# Patient Record
Sex: FEMALE | Race: WHITE | NOT HISPANIC OR LATINO | Employment: OTHER | URBAN - METROPOLITAN AREA
[De-identification: names, ages, dates, MRNs, and addresses within clinical notes are randomized per-mention and may not be internally consistent; named-entity substitution may affect disease eponyms.]

---

## 2024-02-21 NOTE — PROGRESS NOTES
Farzana Michelle female   1955 68 y.o.   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 Also, at same time a left breast core biopsy was performed demonstrating atypical ductal hyperplasia (ADH) and radial scar. She presented to  for second opinion. 6/12/2019 Mary Lopez performed a right breast Magseed localized partial mastectomy, right Magseed localized excisional biopsy, Magtrace injection for SentiNOT, left breast Magseed localized excisional biopsy and a bilateral oncoplastic reduction. Final pathology demonstrated right breast DCIS, intermediate grade, solid pattern, in part involving sclerosing adenosis and a complex sclerosing lesion, greatest dimension 2.1 cm with positive margin at new superior margin and less than 1mm from new medial margins and 2mm from final anterior margin. Right breast excisional biopsy demonstrated complex sclerosing lesion. Left breast excisional biopsy demonstrated DCIS, intermediate to high nuclear grade in part involving sclerosing adenosis, estimated size 2.1cm, margins positive. Left breast second excisional biopsy site demonstrated DCIS, intermediate to high grade involving sclerosing adenosis, estimated size 2.0cm, positive margin at anterior and less than 1mm from superior and lateral margins.     7/3/2019 she returned to the OR for right breast nipple sparing completion mastectomy with sentinel lymph node biopsy and left breast nipple sparing mastectomy with bilateral direct implant reconstruction (MODESTO Snow). Final pathology demonstrated right breast tissue with no residual carcinoma, two sentinel lymph nodes, negative (0/2) and left breast tissue with no residual carcinoma. 8/4/2021 she returned to the OR for revision  of bilateral reconstruction with silicone implant exchange and fat grafting (MODESTO Snow).      She presents today for annual exam. She is here with her , Anthony. She denies any new masses or lumps. She states she is still considering additional surgery with Dr. Snow, but hasn't met with him yet. She lives in [place], [place].      FEMALE HISTORY: menarche age 11, , first birth age 33, menopause age 53 (s/p partial hysterectomy for abnormal bleeding), ovaries intact, took Raloxifene [id number] for osteoporosis, no HRT     FAMILY CANCER HISTORY:  Father: Bone cancer  Sister: Gallbladder cancer      Surgical History  She has a past surgical history that includes Other surgical history (2019); Other surgical history (2019); Other surgical history (2019); Other surgical history (10/27/2020); Other surgical history (2019); BI mammo guided breast right localization (Right, 2019); and BI mammo guided breast left localization (Left, 2019).     Social History  She reports that she quit smoking about 36 years ago. Her smoking use included cigarettes. She started smoking about 52 years ago. She has never used smokeless tobacco. She reports that she does not currently use alcohol. She reports that she does not use drugs.    Family History  No family history on file.     Allergies  Bee venom protein (honey bee)    Medications  Current Outpatient Medications   Medication Instructions    acetaminophen (Tylenol) 325 mg tablet oral, Every 4 hours RT    benzonatate (Tessalon) 200 mg capsule     Bifidobacterium infantis (ALIGN) 4 mg capsule oral    bimatoprost (Latisse) 0.03 % ophthalmic solution 1 drop, Topical, Daily RT    biotin 10,000 mcg tablet,disintegrating oral    busPIRone (BUSPAR) 10 mg, oral    cholecalciferol (Vitamin D3) 50 mcg (2,000 unit) capsule oral    cyclobenzaprine (FLEXERIL) 10 mg, oral, 3 times daily PRN    fluticasone (Flonase) 50 mcg/actuation nasal spray 2 sprays,  nasal, Daily RT    hydroCHLOROthiazide (HYDRODiuril) 25 mg tablet 1 tablet, oral, Daily    HYDROcodone-acetaminophen (Norco) 5-325 mg tablet     L. acidophilus/Bifid. animalis 32 billion cell capsule oral    levothyroxine (SYNTHROID) 100 mcg, oral, Daily RT    linaCLOtide (LINZESS) 72 mcg, oral, Daily RT    meloxicam (Mobic) 15 mg tablet     multivitamin tablet oral    omeprazole (PRILOSEC) 40 mg, oral, 2 times daily    ondansetron ODT (ZOFRAN-ODT) 4 mg, oral, Every 8 hours PRN    tiZANidine (Zanaflex) 2 mg capsule oral    triamterene-hydrochlorothiazid (Dyazide) 37.5-25 mg capsule 1 capsule, oral, Daily RT         REVIEW OF SYSTEMS    Constitutional:  Negative for appetite change, fatigue, fever and unexpected weight change.   HENT:  Negative for ear pain, hearing loss, nosebleeds, sore throat and trouble swallowing.    Eyes:  Negative for discharge, itching and visual disturbance.   Respiratory:  Negative for cough, chest tightness and shortness of breath.    Cardiovascular:  Negative for chest pain, palpitations and leg swelling.   Breast: as indicated in HPI  Gastrointestinal:  Negative for abdominal pain, constipation, diarrhea and nausea.   Endocrine: Negative for cold intolerance and heat intolerance.   Genitourinary:  Negative for dysuria, frequency, hematuria, pelvic pain and vaginal bleeding.   Musculoskeletal:  Negative for arthralgias, back pain, gait problem, joint swelling and myalgias.   Skin:  Negative for color change and rash.   Allergic/Immunologic: Negative for environmental allergies and food allergies.   Neurological:  Negative for dizziness, tremors, speech difficulty, weakness, numbness and headaches.   Hematological:  Does not bruise/bleed easily.   Psychiatric/Behavioral:  Negative for agitation, dysphoric mood and sleep disturbance. The patient is not nervous/anxious.         Past Medical History  She has a past medical history of Personal history of other mental and behavioral  disorders.     Physical Exam  Patient is alert and oriented x3 and in a relaxed and appropriate mood. Her gait is steady and hand grasps are equal. Sclera is clear. Both breasts have been removed with well-healed pedicle method incisions and silicone implants in place, smooth and mobile. The overlying tissue is soft without palpable abnormalities, discrete nodules or masses. The skin and nipples appear normal. The right axilla has a well-healed biopsy incision with palpable scar tissue. There is no cervical, supraclavicular or axillary lymphadenopathy. Heart rate and rhythm normal, S1 and S2 appreciated. The lungs are clear to auscultation bilaterally. Abdomen is soft and non-tender.       Physical Exam  Chest:              Last Recorded Vitals  Vitals:    03/04/24 1323   BP: 145/84   Pulse: 80            Assessment/Plan   Normal clinical exam and imaging, history of bilateral DCIS, s/p bilateral mastectomy with reconstruction, no family history of breast cancer     Plan: Return in one year for clinical exam and office visit.     Patient Discussion/Summary  Your clinical examination and imaging are normal. Please return in one year for clinical exam and office visit or sooner if you have any problems or concerns.     You can see your health information, review clinical summaries from office visits & test results online when you follow your health with MY  Chart, a personal health record. To sign up go to [email or web address]. If you need assistance with signing up or trouble getting into your account call [employer] Patient Line 24/7 at [phone].    My office phone number is [phone] if you need to get in touch with me or have additional questions or concerns. Thank you for choosing [facility] and trusting me as your healthcare provider. I look forward to seeing you again at your next office visit. I am honored to be a provider on your health care team and I remain dedicated to helping you  achieve your health goals.      Stella Arce, APRN-CNP

## 2024-02-28 PROBLEM — J04.0 REFLUX LARYNGITIS: Status: ACTIVE | Noted: 2022-06-17

## 2024-02-28 PROBLEM — G47.00 INSOMNIA: Status: ACTIVE | Noted: 2024-02-28

## 2024-02-28 PROBLEM — Z98.890 S/P BREAST RECONSTRUCTION: Status: ACTIVE | Noted: 2024-02-28

## 2024-02-28 PROBLEM — E55.9 VITAMIN D DEFICIENCY: Status: ACTIVE | Noted: 2020-12-02

## 2024-02-28 PROBLEM — F41.8 DEPRESSION WITH ANXIETY: Status: ACTIVE | Noted: 2024-02-28

## 2024-02-28 PROBLEM — N65.0 ACQUIRED CONTOUR DEFORMITY OF BREAST: Status: ACTIVE | Noted: 2024-02-28

## 2024-02-28 PROBLEM — R92.8 ABNORMAL MRI, BREAST: Status: ACTIVE | Noted: 2024-02-28

## 2024-02-28 PROBLEM — F90.0 ATTENTION DEFICIT HYPERACTIVITY DISORDER (ADHD), PREDOMINANTLY INATTENTIVE TYPE: Status: ACTIVE | Noted: 2019-06-19

## 2024-02-28 PROBLEM — L72.9 CUTANEOUS CYST: Status: ACTIVE | Noted: 2021-12-07

## 2024-02-28 PROBLEM — N62 MACROMASTIA: Status: ACTIVE | Noted: 2024-02-28

## 2024-02-28 PROBLEM — K58.1 IRRITABLE BOWEL SYNDROME WITH CONSTIPATION: Status: ACTIVE | Noted: 2019-11-06

## 2024-02-28 PROBLEM — I10 BENIGN ESSENTIAL HYPERTENSION: Status: ACTIVE | Noted: 2019-06-19

## 2024-02-28 PROBLEM — C50.919 BREAST CANCER (MULTI): Status: ACTIVE | Noted: 2019-11-06

## 2024-02-28 PROBLEM — E03.9 HYPOTHYROIDISM: Status: ACTIVE | Noted: 2024-02-28

## 2024-02-28 PROBLEM — Z86.74 HISTORY OF CARDIAC ARREST: Status: ACTIVE | Noted: 2019-06-19

## 2024-02-28 PROBLEM — N65.1 BREAST ASYMMETRY FOLLOWING RECONSTRUCTIVE SURGERY: Status: ACTIVE | Noted: 2024-02-28

## 2024-02-28 PROBLEM — Z90.13 S/P MASTECTOMY, BILATERAL: Status: ACTIVE | Noted: 2024-02-28

## 2024-02-28 PROBLEM — E78.5 HYPERLIPIDEMIA: Status: ACTIVE | Noted: 2020-12-03

## 2024-02-28 PROBLEM — M81.0 OSTEOPOROSIS: Status: ACTIVE | Noted: 2024-02-28

## 2024-02-28 PROBLEM — K21.9 REFLUX LARYNGITIS: Status: ACTIVE | Noted: 2022-06-17

## 2024-02-28 RX ORDER — TRIAMTERENE AND HYDROCHLOROTHIAZIDE 37.5; 25 MG/1; MG/1
1 CAPSULE ORAL
COMMUNITY
Start: 2020-08-25

## 2024-02-28 RX ORDER — LEVOTHYROXINE SODIUM 100 UG/1
100 TABLET ORAL
COMMUNITY
Start: 2022-06-17

## 2024-02-28 RX ORDER — ONDANSETRON 4 MG/1
4 TABLET, ORALLY DISINTEGRATING ORAL EVERY 8 HOURS PRN
COMMUNITY
Start: 2024-01-21

## 2024-02-28 RX ORDER — CHOLECALCIFEROL (VITAMIN D3) 125 MCG
CAPSULE ORAL
COMMUNITY

## 2024-02-28 RX ORDER — FLUTICASONE PROPIONATE 50 MCG
2 SPRAY, SUSPENSION (ML) NASAL
COMMUNITY
Start: 2020-04-08

## 2024-02-28 RX ORDER — BIMATOPROST 3 UG/ML
1 SOLUTION TOPICAL
COMMUNITY
Start: 2022-07-27

## 2024-02-28 RX ORDER — MULTIVITAMIN
TABLET ORAL
COMMUNITY

## 2024-02-28 RX ORDER — ACETAMINOPHEN 325 MG/1
TABLET ORAL
COMMUNITY
Start: 2021-08-07

## 2024-02-28 RX ORDER — CYCLOBENZAPRINE HCL 10 MG
10 TABLET ORAL 3 TIMES DAILY PRN
COMMUNITY
Start: 2021-03-17

## 2024-02-28 RX ORDER — BUSPIRONE HYDROCHLORIDE 10 MG/1
10 TABLET ORAL
COMMUNITY
Start: 2023-04-03

## 2024-02-28 RX ORDER — HYDROCHLOROTHIAZIDE 25 MG/1
1 TABLET ORAL DAILY
COMMUNITY

## 2024-02-28 RX ORDER — ACETAMINOPHEN 500 MG
TABLET ORAL
COMMUNITY

## 2024-02-28 RX ORDER — OMEPRAZOLE 40 MG/1
40 CAPSULE, DELAYED RELEASE ORAL 2 TIMES DAILY
COMMUNITY

## 2024-03-04 ENCOUNTER — OFFICE VISIT (OUTPATIENT)
Dept: SURGICAL ONCOLOGY | Facility: CLINIC | Age: 69
End: 2024-03-04
Payer: MEDICARE

## 2024-03-04 VITALS
SYSTOLIC BLOOD PRESSURE: 145 MMHG | HEIGHT: 62 IN | HEART RATE: 80 BPM | DIASTOLIC BLOOD PRESSURE: 84 MMHG | WEIGHT: 139.99 LBS | BODY MASS INDEX: 25.76 KG/M2

## 2024-03-04 DIAGNOSIS — Z08 ENCOUNTER FOR FOLLOW-UP SURVEILLANCE OF BREAST CANCER: Primary | ICD-10-CM

## 2024-03-04 DIAGNOSIS — Z85.3 ENCOUNTER FOR FOLLOW-UP SURVEILLANCE OF BREAST CANCER: Primary | ICD-10-CM

## 2024-03-04 PROCEDURE — 3079F DIAST BP 80-89 MM HG: CPT | Performed by: NURSE PRACTITIONER

## 2024-03-04 PROCEDURE — 3077F SYST BP >= 140 MM HG: CPT | Performed by: NURSE PRACTITIONER

## 2024-03-04 PROCEDURE — 1126F AMNT PAIN NOTED NONE PRSNT: CPT | Performed by: NURSE PRACTITIONER

## 2024-03-04 PROCEDURE — 99213 OFFICE O/P EST LOW 20 MIN: CPT | Performed by: NURSE PRACTITIONER

## 2024-03-04 PROCEDURE — 1159F MED LIST DOCD IN RCRD: CPT | Performed by: NURSE PRACTITIONER

## 2024-03-04 RX ORDER — MELOXICAM 15 MG/1
TABLET ORAL
COMMUNITY
Start: 2024-02-15

## 2024-03-04 RX ORDER — TIZANIDINE HYDROCHLORIDE 2 MG/1
CAPSULE, GELATIN COATED ORAL
COMMUNITY
Start: 2021-03-23

## 2024-03-04 RX ORDER — HYDROCODONE BITARTRATE AND ACETAMINOPHEN 5; 325 MG/1; MG/1
TABLET ORAL
COMMUNITY
Start: 2024-02-06

## 2024-03-04 RX ORDER — BENZONATATE 200 MG/1
CAPSULE ORAL
COMMUNITY
Start: 2024-01-21

## 2024-03-04 ASSESSMENT — PAIN SCALES - GENERAL: PAINLEVEL: 0-NO PAIN

## 2024-03-04 NOTE — PATIENT INSTRUCTIONS
Your clinical examination and imaging are normal. Please return in one year for clinical exam and office visit or sooner if you have any problems or concerns.     You can see your health information, review clinical summaries from office visits & test results online when you follow your health with MY  Chart, a personal health record. To sign up go to [email or web address]. If you need assistance with signing up or trouble getting into your account call [employer] Patient Line 24/7 at [phone].    My office phone number is [phone] if you need to get in touch with me or have additional questions or concerns. Thank you for choosing [facility] and trusting me as your healthcare provider. I look forward to seeing you again at your next office visit. I am honored to be a provider on your health care team and I remain dedicated to helping you achieve your health goals.

## 2025-02-12 NOTE — PROGRESS NOTES
Farzana Michelle female   1955 69 y.o.   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 Also, at same time a left breast core biopsy was performed demonstrating atypical ductal hyperplasia (ADH) and radial scar. She presented to  for second opinion. 6/12/2019 Mary Lopez performed a right breast Magseed localized partial mastectomy, right Magseed localized excisional biopsy, Magtrace injection for SentiNOT, left breast Magseed localized excisional biopsy and a bilateral oncoplastic reduction. Final pathology demonstrated right breast DCIS, intermediate grade, solid pattern, in part involving sclerosing adenosis and a complex sclerosing lesion, greatest dimension 2.1 cm with positive margin at new superior margin and less than 1mm from new medial margins and 2mm from final anterior margin. Right breast excisional biopsy demonstrated complex sclerosing lesion. Left breast excisional biopsy demonstrated DCIS, intermediate to high nuclear grade in part involving sclerosing adenosis, estimated size 2.1cm, margins positive. Left breast second excisional biopsy site demonstrated DCIS, intermediate to high grade involving sclerosing adenosis, estimated size 2.0cm, positive margin at anterior and less than 1mm from superior and lateral margins.     7/3/2019 she returned to the OR for right breast nipple sparing completion mastectomy with sentinel lymph node biopsy and left breast nipple sparing mastectomy with bilateral direct implant reconstruction (MODESTO Snow). Final pathology demonstrated right breast tissue with no residual carcinoma, two sentinel lymph nodes, negative (0/2) and left breast tissue with no residual carcinoma. 8/4/2021 she returned to the OR for revision  of bilateral reconstruction with silicone implant exchange and fat grafting (MODESTO Snow).      She presents today for annual exam. She is here with her , Anthony. She denies any new masses or lumps. She states she is still considering additional surgery with Dr. Snow, but hasn't met with him yet. She lives in [place], [place].      FEMALE HISTORY: menarche age 11, , first birth age 33, menopause age 53 (s/p partial hysterectomy for abnormal bleeding), ovaries intact, took Raloxifene [id number] for osteoporosis, no HRT     FAMILY CANCER HISTORY:  Father: Bone cancer  Sister: Gallbladder cancer      Surgical History  She has a past surgical history that includes Other surgical history (2019); Other surgical history (2019); Other surgical history (2019); Other surgical history (10/27/2020); Other surgical history (2019); BI mammo guided breast right localization (Right, 2019); and BI mammo guided breast left localization (Left, 2019).     Social History  She reports that she quit smoking about 37 years ago. Her smoking use included cigarettes. She started smoking about 53 years ago. She has never used smokeless tobacco. She reports that she does not currently use alcohol. She reports that she does not use drugs.    Family History  No family history on file.     Allergies  Bee venom protein (honey bee)    Medications  Current Outpatient Medications   Medication Instructions    acetaminophen (Tylenol) 325 mg tablet oral, Every 4 hours RT    benzonatate (Tessalon) 200 mg capsule     Bifidobacterium infantis (ALIGN) 4 mg capsule oral    bimatoprost (Latisse) 0.03 % ophthalmic solution 1 drop, Topical, Daily RT    biotin 10,000 mcg tablet,disintegrating oral    busPIRone (BUSPAR) 10 mg, oral    cholecalciferol (Vitamin D3) 50 mcg (2,000 unit) capsule oral    cyclobenzaprine (FLEXERIL) 10 mg, oral, 3 times daily PRN    fluticasone (Flonase) 50 mcg/actuation nasal spray 2 sprays,  nasal, Daily RT    hydroCHLOROthiazide (HYDRODiuril) 25 mg tablet 1 tablet, oral, Daily    HYDROcodone-acetaminophen (Norco) 5-325 mg tablet     L. acidophilus/Bifid. animalis 32 billion cell capsule oral    levothyroxine (SYNTHROID) 100 mcg, oral, Daily RT    linaCLOtide (LINZESS) 72 mcg, oral, Daily RT    meloxicam (Mobic) 15 mg tablet     multivitamin tablet oral    omeprazole (PRILOSEC) 40 mg, oral, 2 times daily    ondansetron ODT (ZOFRAN-ODT) 4 mg, oral, Every 8 hours PRN    tiZANidine (Zanaflex) 2 mg capsule oral    triamterene-hydrochlorothiazid (Dyazide) 37.5-25 mg capsule 1 capsule, oral, Daily RT         REVIEW OF SYSTEMS    Constitutional:  Negative for appetite change, fatigue, fever and unexpected weight change.   HENT:  Negative for ear pain, hearing loss, nosebleeds, sore throat and trouble swallowing.    Eyes:  Negative for discharge, itching and visual disturbance.   Respiratory:  Negative for cough, chest tightness and shortness of breath.    Cardiovascular:  Negative for chest pain, palpitations and leg swelling.   Breast: as indicated in HPI  Gastrointestinal:  Negative for abdominal pain, constipation, diarrhea and nausea.   Endocrine: Negative for cold intolerance and heat intolerance.   Genitourinary:  Negative for dysuria, frequency, hematuria, pelvic pain and vaginal bleeding.   Musculoskeletal:  Negative for arthralgias, back pain, gait problem, joint swelling and myalgias.   Skin:  Negative for color change and rash.   Allergic/Immunologic: Negative for environmental allergies and food allergies.   Neurological:  Negative for dizziness, tremors, speech difficulty, weakness, numbness and headaches.   Hematological:  Does not bruise/bleed easily.   Psychiatric/Behavioral:  Negative for agitation, dysphoric mood and sleep disturbance. The patient is not nervous/anxious.         Past Medical History  She has a past medical history of Personal history of other mental and behavioral  disorders.     Physical Exam  Patient is alert and oriented x3 and in a relaxed and appropriate mood. Her gait is steady and hand grasps are equal. Sclera is clear. Both breasts have been removed with well-healed pedicle method incisions and silicone implants in place, smooth and mobile. The overlying tissue is soft without palpable abnormalities, discrete nodules or masses. The skin and nipples appear normal. The right axilla has a well-healed biopsy incision with palpable scar tissue. There is no cervical, supraclavicular or axillary lymphadenopathy. Heart rate and rhythm normal, S1 and S2 appreciated. The lungs are clear to auscultation bilaterally. Abdomen is soft and non-tender.       Physical Exam  Chest:              Last Recorded Vitals  There were no vitals filed for this visit.           Assessment/Plan   Normal clinical exam and imaging, history of bilateral DCIS, s/p bilateral mastectomy with reconstruction, no family history of breast cancer     Plan: Return in one year for clinical exam and office visit.     Patient Discussion/Summary  Your clinical examination is normal. Please return in one year for clinical exam and office visit or sooner if you have any problems or concerns.     You can see your health information, review clinical summaries from office visits & test results online when you follow your health with MY  Chart, a personal health record. To sign up go to [email or web address]. If you need assistance with signing up or trouble getting into your account call [employer] Patient Line 24/7 at [phone].    My office phone number is [phone] if you need to get in touch with me or have additional questions or concerns. Thank you for choosing [facility] and trusting me as your healthcare provider. I look forward to seeing you again at your next office visit. I am honored to be a provider on your health care team and I remain dedicated to helping you achieve your health  goals.      tSella Arce, APRN-CNP

## 2025-03-03 ENCOUNTER — APPOINTMENT (OUTPATIENT)
Dept: SURGICAL ONCOLOGY | Facility: CLINIC | Age: 70
End: 2025-03-03
Payer: MEDICARE

## 2025-03-26 NOTE — PROGRESS NOTES
Farzana Michelle female   1955 69 y.o.   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 Also, at same time a left breast core biopsy was performed demonstrating atypical ductal hyperplasia (ADH) and radial scar. She presented to  for second opinion. 6/12/2019 Mary Lopez performed a right breast Magseed localized partial mastectomy, right Magseed localized excisional biopsy, Magtrace injection for SentiNOT, left breast Magseed localized excisional biopsy and a bilateral oncoplastic reduction. Final pathology demonstrated right breast DCIS, intermediate grade, solid pattern, in part involving sclerosing adenosis and a complex sclerosing lesion, greatest dimension 2.1 cm with positive margin at new superior margin and less than 1mm from new medial margins and 2mm from final anterior margin. Right breast excisional biopsy demonstrated complex sclerosing lesion. Left breast excisional biopsy demonstrated DCIS, intermediate to high nuclear grade in part involving sclerosing adenosis, estimated size 2.1cm, margins positive. Left breast second excisional biopsy site demonstrated DCIS, intermediate to high grade involving sclerosing adenosis, estimated size 2.0cm, positive margin at anterior and less than 1mm from superior and lateral margins.     7/3/2019 she returned to the OR for right breast nipple sparing completion mastectomy with sentinel lymph node biopsy and left breast nipple sparing mastectomy with bilateral direct implant reconstruction (MODESTO Snow). Final pathology demonstrated right breast tissue with no residual carcinoma, two sentinel lymph nodes, negative (0/2) and left breast tissue with no residual carcinoma. 8/4/2021 she returned to the OR for revision  of bilateral reconstruction with silicone implant exchange and fat grafting (MODESTO Snow).      She presents today for annual exam. She is here with her , Anthony. She denies any new masses or lumps. She states she is still considering additional surgery with Dr. Snow, but hasn't met with him yet. She lives in [place], [place].      FEMALE HISTORY: menarche age 11, , first birth age 33, menopause age 53 (s/p partial hysterectomy for abnormal bleeding), ovaries intact, took Raloxifene [id number] for osteoporosis, no HRT     FAMILY CANCER HISTORY:  Father: Bone cancer  Sister: Gallbladder cancer      Surgical History  She has a past surgical history that includes Other surgical history (2019); Other surgical history (2019); Other surgical history (2019); Other surgical history (10/27/2020); Other surgical history (2019); BI mammo guided breast right localization (Right, 2019); and BI mammo guided breast left localization (Left, 2019).     Social History  She reports that she quit smoking about 37 years ago. Her smoking use included cigarettes. She started smoking about 53 years ago. She has never used smokeless tobacco. She reports that she does not currently use alcohol. She reports that she does not use drugs.    Family History  No family history on file.     Allergies  Bee venom protein (honey bee)    Medications  Current Outpatient Medications   Medication Instructions    acetaminophen (Tylenol) 325 mg tablet Every 4 hours RT    benzonatate (Tessalon) 200 mg capsule     Bifidobacterium infantis (ALIGN) 4 mg capsule Take by mouth.    bimatoprost (Latisse) 0.03 % ophthalmic solution 1 drop, Daily RT    biotin 10,000 mcg tablet,disintegrating Take by mouth.    busPIRone (BUSPAR) 10 mg    cholecalciferol (Vitamin D3) 50 mcg (2,000 unit) capsule Take by mouth.    cyclobenzaprine (FLEXERIL) 10 mg, 3 times daily PRN    fluticasone (Flonase) 50 mcg/actuation nasal spray 2 sprays,  Daily RT    hydroCHLOROthiazide (HYDRODiuril) 25 mg tablet 1 tablet, Daily    HYDROcodone-acetaminophen (Norco) 5-325 mg tablet     L. acidophilus/Bifid. animalis 32 billion cell capsule Take by mouth.    levothyroxine (SYNTHROID) 100 mcg, Daily RT    linaCLOtide (LINZESS) 72 mcg, Daily RT    meloxicam (Mobic) 15 mg tablet     multivitamin tablet Take by mouth.    omeprazole (PRILOSEC) 40 mg, 2 times daily    ondansetron ODT (ZOFRAN-ODT) 4 mg, Every 8 hours PRN    tiZANidine (Zanaflex) 2 mg capsule Take by mouth.    triamterene-hydrochlorothiazid (Dyazide) 37.5-25 mg capsule 1 capsule, Daily RT         REVIEW OF SYSTEMS    Constitutional:  Negative for appetite change, fatigue, fever and unexpected weight change.   HENT:  Negative for ear pain, hearing loss, nosebleeds, sore throat and trouble swallowing.    Eyes:  Negative for discharge, itching and visual disturbance.   Respiratory:  Negative for cough, chest tightness and shortness of breath.    Cardiovascular:  Negative for chest pain, palpitations and leg swelling.   Breast: as indicated in HPI  Gastrointestinal:  Negative for abdominal pain, constipation, diarrhea and nausea.   Endocrine: Negative for cold intolerance and heat intolerance.   Genitourinary:  Negative for dysuria, frequency, hematuria, pelvic pain and vaginal bleeding.   Musculoskeletal:  Negative for arthralgias, back pain, gait problem, joint swelling and myalgias.   Skin:  Negative for color change and rash.   Allergic/Immunologic: Negative for environmental allergies and food allergies.   Neurological:  Negative for dizziness, tremors, speech difficulty, weakness, numbness and headaches.   Hematological:  Does not bruise/bleed easily.   Psychiatric/Behavioral:  Negative for agitation, dysphoric mood and sleep disturbance. The patient is not nervous/anxious.         Past Medical History  She has a past medical history of Personal history of other mental and behavioral disorders.     Physical  Exam  Patient is alert and oriented x3 and in a relaxed and appropriate mood. Her gait is steady and hand grasps are equal. Sclera is clear. Both breasts have been removed with well-healed pedicle method incisions and silicone implants in place, smooth and mobile. The overlying tissue is soft without palpable abnormalities, discrete nodules or masses. The skin and nipples appear normal. The right axilla has a well-healed biopsy incision with palpable scar tissue. There is no cervical, supraclavicular or axillary lymphadenopathy. Heart rate and rhythm normal, S1 and S2 appreciated. The lungs are clear to auscultation bilaterally.     Physical Exam  Chest:              Last Recorded Vitals  Vitals:    04/10/25 1503   BP: 137/69   Pulse: 73   Temp: 36.2 °C (97.2 °F)   SpO2: 99%              Assessment/Plan   Normal clinical exam, history of bilateral DCIS, s/p bilateral mastectomy with reconstruction, no family history of breast cancer     Plan: Return in one year for clinical exam and office visit.     Patient Discussion/Summary  Your clinical examination is normal. Please return in one year for clinical exam and office visit or sooner if you have any problems or concerns.     You can see your health information, review clinical summaries from office visits & test results online when you follow your health with MY  Chart, a personal health record. To sign up go to [email or web address]. If you need assistance with signing up or trouble getting into your account call [employer] [facility] Patient Line 24/7 at [phone].    My office phone number is [phone] if you need to get in touch with me or have additional questions or concerns. Thank you for choosing [facility] and trusting me as your healthcare provider. I look forward to seeing you again at your next office visit. I am honored to be a provider on your health care team and I remain dedicated to helping you achieve your health goals.      Stella MOLINA  Claude, APRN-CNP

## 2025-04-10 ENCOUNTER — OFFICE VISIT (OUTPATIENT)
Dept: SURGICAL ONCOLOGY | Facility: CLINIC | Age: 70
End: 2025-04-10
Payer: MEDICARE

## 2025-04-10 VITALS
BODY MASS INDEX: 34.91 KG/M2 | HEART RATE: 73 BPM | SYSTOLIC BLOOD PRESSURE: 137 MMHG | TEMPERATURE: 97.2 F | DIASTOLIC BLOOD PRESSURE: 69 MMHG | WEIGHT: 192.4 LBS | OXYGEN SATURATION: 99 %

## 2025-04-10 DIAGNOSIS — Z85.3 ENCOUNTER FOR FOLLOW-UP SURVEILLANCE OF BREAST CANCER: Primary | ICD-10-CM

## 2025-04-10 DIAGNOSIS — Z08 ENCOUNTER FOR FOLLOW-UP SURVEILLANCE OF BREAST CANCER: Primary | ICD-10-CM

## 2025-04-10 PROCEDURE — 3078F DIAST BP <80 MM HG: CPT | Performed by: NURSE PRACTITIONER

## 2025-04-10 PROCEDURE — 1036F TOBACCO NON-USER: CPT | Performed by: NURSE PRACTITIONER

## 2025-04-10 PROCEDURE — 99213 OFFICE O/P EST LOW 20 MIN: CPT | Performed by: NURSE PRACTITIONER

## 2025-04-10 PROCEDURE — 1159F MED LIST DOCD IN RCRD: CPT | Performed by: NURSE PRACTITIONER

## 2025-04-10 PROCEDURE — 1126F AMNT PAIN NOTED NONE PRSNT: CPT | Performed by: NURSE PRACTITIONER

## 2025-04-10 PROCEDURE — 3075F SYST BP GE 130 - 139MM HG: CPT | Performed by: NURSE PRACTITIONER

## 2025-04-10 ASSESSMENT — PAIN SCALES - GENERAL: PAINLEVEL_OUTOF10: 0-NO PAIN

## 2025-04-10 NOTE — PATIENT INSTRUCTIONS
Your clinical examination is normal. Please return in one year for clinical exam and office visit or sooner if you have any problems or concerns.     You can see your health information, review clinical summaries from office visits & test results online when you follow your health with MY  Chart, a personal health record. To sign up go to [email or web address]. If you need assistance with signing up or trouble getting into your account call [employer] Patient Line 24/7 at [phone].    My office phone number is [phone] if you need to get in touch with me or have additional questions or concerns. Thank you for choosing [facility] and trusting me as your healthcare provider. I look forward to seeing you again at your next office visit. I am honored to be a provider on your health care team and I remain dedicated to helping you achieve your health goals.